# Patient Record
Sex: FEMALE | URBAN - METROPOLITAN AREA
[De-identification: names, ages, dates, MRNs, and addresses within clinical notes are randomized per-mention and may not be internally consistent; named-entity substitution may affect disease eponyms.]

---

## 2023-12-05 ENCOUNTER — APPOINTMENT (RX ONLY)
Dept: URBAN - METROPOLITAN AREA CLINIC 83 | Facility: CLINIC | Age: 73
Setting detail: DERMATOLOGY
End: 2023-12-05

## 2023-12-05 DIAGNOSIS — L72.8 OTHER FOLLICULAR CYSTS OF THE SKIN AND SUBCUTANEOUS TISSUE: ICD-10-CM

## 2023-12-05 PROCEDURE — 99202 OFFICE O/P NEW SF 15 MIN: CPT

## 2023-12-05 PROCEDURE — ? COUNSELING

## 2023-12-05 PROCEDURE — ? CONSULTATION EXCISION

## 2023-12-05 ASSESSMENT — LOCATION SIMPLE DESCRIPTION DERM: LOCATION SIMPLE: RIGHT UPPER BACK

## 2023-12-05 ASSESSMENT — LOCATION ZONE DERM: LOCATION ZONE: TRUNK

## 2023-12-05 ASSESSMENT — LOCATION DETAILED DESCRIPTION DERM: LOCATION DETAILED: RIGHT MID-UPPER BACK

## 2023-12-05 NOTE — PROCEDURE: CONSULTATION EXCISION
X Size Of Lesion In Cm (Optional): 2
Other Plan: Punch Excision
Detail Level: Detailed
Size Of Lesion: 2.5

## 2023-12-07 ENCOUNTER — APPOINTMENT (RX ONLY)
Dept: URBAN - METROPOLITAN AREA CLINIC 83 | Facility: CLINIC | Age: 73
Setting detail: DERMATOLOGY
End: 2023-12-07

## 2023-12-07 DIAGNOSIS — Z41.9 ENCOUNTER FOR PROCEDURE FOR PURPOSES OTHER THAN REMEDYING HEALTH STATE, UNSPECIFIED: ICD-10-CM

## 2023-12-07 PROCEDURE — ? COSMETIC QUOTE

## 2023-12-07 PROCEDURE — ? FILLERS

## 2023-12-07 PROCEDURE — ? XEOMIN

## 2023-12-07 NOTE — PROCEDURE: XEOMIN
Show Right And Left Pupillary Line Units: No
Mentalis Units: 0
Show Additional Area 4: Yes
Additional Area 4 Location: [facility]
Detail Level: Detailed
Additional Area 4 Units: 4
Show Inventory Tab: Show
Additional Area 1 Location: Lateral Brow
Consent: Written consent obtained. Risks include but not limited to lid/brow ptosis, bruising, swelling, diplopia, temporary effect, incomplete chemical denervation.
Additional Area 2 Units: 24
Post-Care Instructions: Patient instructed to not lie down for 4 hours and limit physical activity for 24 hours.
Additional Area 2 Location: [facility]
Additional Area 3 Location: [facility]
Glabellar Complex Units: 25
Dilution (U/0.1 Cc): 2
Forehead Units: 12

## 2023-12-07 NOTE — PROCEDURE: FILLERS
Nasolabial Folds Filler Volume In Cc: 0
Include Cannula Information In Note?: No
Number Of Syringes (Required For Inventory): 1
Additional Anesthesia Volume In Cc: 6
Inventory Information: This plan will send filler information to inventory based on the fillers you select. Multiple fillers can be sent but you must ensure you select the appropriate fillers in the inventory tab.
Detail Level: Detailed
Consent: Written consent obtained. Risks include but not limited to bruising, beading, irregular texture, ulceration, infection, allergic reaction, scar formation, incomplete augmentation, temporary nature, procedural pain.
Post-Care Instructions: Patient instructed to apply ice to reduce swelling.
Show Inventory Tab: Show
Filler: Versa
Nasolabial Folds Filler Volume In Cc: 1.2
Map Statment: See Attach Map for Complete Details
Filler: Radiesse+
Number Of Syringes (Required For Inventory): 2
Anesthesia Type: 1% lidocaine with epinephrine
Anesthesia Volume In Cc: 0.5

## 2023-12-07 NOTE — PROCEDURE: COSMETIC QUOTE
Laser 1 Units: 0
Injectable  20 Price/Unit (In Dollars- Use Only Numbers And Decimals): 0.00
Misc Procedure 4: Threads Neck Lift
Include Signature: No
Injectable 10 Price/Unit (In Dollars- Use Only Numbers And Decimals): 750.00
Laser 13 Price/Unit (In Dollars- Use Only Numbers And Decimals): 1500.00
Injectable  14: Qwo/Session
Laser 17: LHR-Arms
Laser 6: Sk Removal-Face and Neck
Injectable 4: Volux
Misc Procedure 8 Price/Unit (In Dollars- Use Only Numbers And Decimals): 250.00
Misc Procedure 1: Threadlift Per Thread
Laser 3 Price/Unit (In Dollars- Use Only Numbers And Decimals): 1800.00
Laser 10 Price/Unit (In Dollars- Use Only Numbers And Decimals): 1000.00
Face Procedure 1: mole removal
Laser 14: LHR-Lip
Injectable 7 Price/Unit (In Dollars- Use Only Numbers And Decimals): 700.00
Injectable  11: Botox
Misc Procedure 5 Price/Unit (In Dollars- Use Only Numbers And Decimals): 2000.00
Injectable 1: Radiesse
Include Sales Tax On Surgeon's Fees: Yes
Laser 4: Full Face-Light
Injectable 1 Free Text Discount (In Dollars- Use Only Numbers And Decimals): 100.00
Facility Fee Units (Optional): 1
Laser 11 Price/Unit (In Dollars- Use Only Numbers And Decimals): 500.00
Face Procedure 2: punch excision-lip
Injectable 8 Price/Unit (In Dollars- Use Only Numbers And Decimals): 400.00
Laser 4 Price/Unit (In Dollars- Use Only Numbers And Decimals): 1250.00
Laser 15: LHR-Axilla
Injectable  12: Xeomin
Injectable 2: Versa
Injectable  12 Units: 69
Laser 12: IPL Photoface-Face and Neck
Injectable 2 Units: 2
Misc Procedure 3 Price/Unit (In Dollars- Use Only Numbers And Decimals): 10.00
Injectable 9: RHA 4
Misc Procedure 7: WEDERM Perfect Pout
Injectable 2 Percentage Discount: 50
Laser 2: Full Face and Neck-Fusion
Laser 1: Full Face-Fusion
Injectable  13 Price/Unit (In Dollars- Use Only Numbers And Decimals): 600.00
Laser 9: CO2RE- Pox scarring-cheeks
Injectable 3 Delgado/Unit (In Dollars- Use Only Numbers And Decimals): 800.00
Injectable 6 Price/Unit (In Dollars- Use Only Numbers And Decimals): 795.00
Laser 13: IPL Photofacial-Face, Neck, Chest
Injectable 10: RHA 3
Misc Procedure 8: Thread for pox scarring-cheeks
Laser 3: Full Face-Mid Depth
Laser 10: CO2RE Intima
Additional Note (Will Following Above Verbiage): Neurotoxin promo discount, by 30 get 10 free units\\nPatient is good and a total of 69 units that will only pay for 59th due to the promo. \\nXeomin sample bottle lot yina [id number] expiration 2024–12
Injectable 7: Vollure
Misc Procedure 1 Price/Unit (In Dollars- Use Only Numbers And Decimals): 225.00
Misc Procedure 5: Threads Eyelift
Injectable  11 Price/Unit (In Dollars- Use Only Numbers And Decimals): 12.00
Laser 18: LHR-Legs/Back-TBD
Laser 7: VV/PPP on Genitals
Injectable 5: Volbella 0.55cc
Misc Procedure 2: Collagen threads-Lip - lips
Injectable 8: Skinvive
Laser 11: IPL Photofacial
Injectable 5 Price/Unit (In Dollars- Use Only Numbers And Decimals): 395.00
Misc Procedure 2 Price/Unit (In Dollars- Use Only Numbers And Decimals): 200.00
Misc Procedure 6: Threads Browlift
Laser 15 Price/Unit (In Dollars- Use Only Numbers And Decimals): 300.00
Laser 19: FIFI MOORE
Detail Level: Zone
Laser 8: Profound-Cheek Scarring
Injectable 6: Volbella 1cc
Misc Procedure 3: Collagen Stimulating threads Per thread only
Injectable 12 Free Text Discount (In Dollars- Use Only Numbers And Decimals): 120.00
Laser 12 Price/Unit (In Dollars- Use Only Numbers And Decimals): 900.00
Laser 16: Kiley
Laser 5: Lesion Removal
Injectable  13: Kybella/Vial
Misc Procedure 7 Price/Unit (In Dollars- Use Only Numbers And Decimals): 50.00
Injectable 3: Voluma
Laser 2 Price/Unit (In Dollars- Use Only Numbers And Decimals): 3000.00
Laser 1 Price/Unit (In Dollars- Use Only Numbers And Decimals): 2500.00

## 2024-02-01 ENCOUNTER — APPOINTMENT (RX ONLY)
Dept: URBAN - METROPOLITAN AREA CLINIC 83 | Facility: CLINIC | Age: 74
Setting detail: DERMATOLOGY
End: 2024-02-01

## 2024-02-01 DIAGNOSIS — Z41.9 ENCOUNTER FOR PROCEDURE FOR PURPOSES OTHER THAN REMEDYING HEALTH STATE, UNSPECIFIED: ICD-10-CM

## 2024-02-01 PROCEDURE — ? COSMETIC CONSULTATION: FILLERS

## 2024-02-01 PROCEDURE — ? FILLERS

## 2024-02-01 NOTE — PROCEDURE: FILLERS
Additional Area 4 Volume In Cc: 0
Include Cannula Information In Note?: No
Number Of Syringes (Required For Inventory): 1
Filler: Versa
Post-Care Instructions: Patient instructed to apply ice to reduce swelling.
Show Inventory Tab: Show
Consent: Written consent obtained. Risks include but not limited to bruising, beading, irregular texture, ulceration, infection, allergic reaction, scar formation, incomplete augmentation, temporary nature, procedural pain.
Detail Level: Detailed
Inventory Information: This plan will send filler information to inventory based on the fillers you select. Multiple fillers can be sent but you must ensure you select the appropriate fillers in the inventory tab.
Additional Anesthesia Volume In Cc: 6
Anesthesia Volume In Cc: 0.5
Anesthesia Type: 1% lidocaine with epinephrine
Vermilion Lips Filler Volume In Cc: 1.2
Map Statment: See Attach Map for Complete Details
Filler Comments: Sample versa used \\nLot- [id number]\\nEXP-2/23/25